# Patient Record
Sex: FEMALE | Race: BLACK OR AFRICAN AMERICAN | Employment: UNEMPLOYED | URBAN - METROPOLITAN AREA
[De-identification: names, ages, dates, MRNs, and addresses within clinical notes are randomized per-mention and may not be internally consistent; named-entity substitution may affect disease eponyms.]

---

## 2024-06-24 ENCOUNTER — HOSPITAL ENCOUNTER (EMERGENCY)
Age: 54
Discharge: HOME | End: 2024-06-24

## 2024-06-24 VITALS
DIASTOLIC BLOOD PRESSURE: 95 MMHG | TEMPERATURE: 99.5 F | BODY MASS INDEX: 42.91 KG/M2 | HEART RATE: 86 BPM | RESPIRATION RATE: 16 BRPM | WEIGHT: 242.2 LBS | SYSTOLIC BLOOD PRESSURE: 137 MMHG | HEIGHT: 63 IN | OXYGEN SATURATION: 97 %

## 2024-06-24 DIAGNOSIS — T14.8XXA PUNCTURE WOUND: Primary | ICD-10-CM

## 2024-06-24 PROCEDURE — 6370000000 HC RX 637 (ALT 250 FOR IP): Performed by: PHYSICIAN ASSISTANT

## 2024-06-24 PROCEDURE — 6360000002 HC RX W HCPCS: Performed by: PHYSICIAN ASSISTANT

## 2024-06-24 PROCEDURE — 99284 EMERGENCY DEPT VISIT MOD MDM: CPT

## 2024-06-24 PROCEDURE — 90714 TD VACC NO PRESV 7 YRS+ IM: CPT | Performed by: PHYSICIAN ASSISTANT

## 2024-06-24 PROCEDURE — 90471 IMMUNIZATION ADMIN: CPT | Performed by: PHYSICIAN ASSISTANT

## 2024-06-24 RX ORDER — CIPROFLOXACIN 500 MG/1
500 TABLET, FILM COATED ORAL 2 TIMES DAILY
Qty: 14 TABLET | Refills: 0 | Status: SHIPPED | OUTPATIENT
Start: 2024-06-24 | End: 2024-07-01

## 2024-06-24 RX ORDER — CIPROFLOXACIN 500 MG/1
500 TABLET, FILM COATED ORAL ONCE
Status: COMPLETED | OUTPATIENT
Start: 2024-06-24 | End: 2024-06-24

## 2024-06-24 RX ADMIN — CLOSTRIDIUM TETANI TOXOID ANTIGEN (FORMALDEHYDE INACTIVATED) AND CORYNEBACTERIUM DIPHTHERIAE TOXOID ANTIGEN (FORMALDEHYDE INACTIVATED) 0.5 ML: 5; 2 INJECTION, SUSPENSION INTRAMUSCULAR at 22:47

## 2024-06-24 RX ADMIN — CIPROFLOXACIN HYDROCHLORIDE 500 MG: 500 TABLET, FILM COATED ORAL at 22:47

## 2024-06-24 ASSESSMENT — PAIN - FUNCTIONAL ASSESSMENT: PAIN_FUNCTIONAL_ASSESSMENT: 0-10

## 2024-06-24 ASSESSMENT — LIFESTYLE VARIABLES
HOW OFTEN DO YOU HAVE A DRINK CONTAINING ALCOHOL: NEVER
HOW MANY STANDARD DRINKS CONTAINING ALCOHOL DO YOU HAVE ON A TYPICAL DAY: PATIENT DOES NOT DRINK

## 2024-06-24 ASSESSMENT — PAIN SCALES - GENERAL: PAINLEVEL_OUTOF10: 8

## 2024-06-25 ASSESSMENT — ENCOUNTER SYMPTOMS
NAUSEA: 0
COUGH: 0
SHORTNESS OF BREATH: 0
ABDOMINAL PAIN: 0
DIARRHEA: 0
RHINORRHEA: 0
VOMITING: 0

## 2024-06-25 NOTE — ED PROVIDER NOTES
Emergency Department immediately if new or worsening symptoms occur. We have discussed the symptoms which are most concerning (e.g., changing or worsening pain, numbness, weakness) that necessitate immediate return.    Final Impression    1. Puncture wound        Blood pressure (!) 137/95, pulse 86, temperature 99.5 °F (37.5 °C), temperature source Oral, resp. rate 16, height 1.6 m (5' 3\"), weight 109.9 kg (242 lb 3.2 oz), SpO2 97 %.      I am the Primary Clinician of Record.  FINAL IMPRESSION      1. Puncture wound          DISPOSITION/PLAN     DISPOSITION Decision To Discharge 06/24/2024 10:59:00 PM      PATIENT REFERRED TO:  [facility]  [phone]  Schedule an appointment as soon as possible for a visit in 2 days      [facility] Emergency Department  [address] [place] [address]  [phone]    As needed, If symptoms worsen      DISCHARGE MEDICATIONS:  Discharge Medication List as of 6/24/2024 10:55 PM        START taking these medications    Details   ciprofloxacin (CIPRO) 500 MG tablet Take 1 tablet by mouth 2 times daily for 7 days, Disp-14 tablet, R-0Print             DISCONTINUED MEDICATIONS:  Discharge Medication List as of 6/24/2024 10:55 PM                 (Please note that portions of this note were completed with a voice recognition program.  Efforts were made to edit the dictations but occasionally words are mis-transcribed.)    Annika Bautista PA-C (electronically signed)        Annika Bautista PA-C  06/25/24 0100

## 2024-06-25 NOTE — ED NOTES
Puncture wound to left heal cleansed with hibiclens and sterile water. Wound dressed w/ DSD. Instructed patient on antibiotic use. Verbalized understanding.